# Patient Record
Sex: MALE | Race: OTHER | HISPANIC OR LATINO | URBAN - METROPOLITAN AREA
[De-identification: names, ages, dates, MRNs, and addresses within clinical notes are randomized per-mention and may not be internally consistent; named-entity substitution may affect disease eponyms.]

---

## 2017-01-17 PROBLEM — Z00.00 ENCOUNTER FOR PREVENTIVE HEALTH EXAMINATION: Status: ACTIVE | Noted: 2017-01-17

## 2017-05-17 ENCOUNTER — EMERGENCY (EMERGENCY)
Facility: HOSPITAL | Age: 67
LOS: 1 days | Discharge: HOME | End: 2017-05-17
Attending: EMERGENCY MEDICINE
Payer: SELF-PAY

## 2017-05-17 VITALS
RESPIRATION RATE: 20 BRPM | OXYGEN SATURATION: 95 % | WEIGHT: 139.99 LBS | TEMPERATURE: 101 F | DIASTOLIC BLOOD PRESSURE: 69 MMHG | HEART RATE: 92 BPM | HEIGHT: 64 IN | SYSTOLIC BLOOD PRESSURE: 113 MMHG

## 2017-05-17 DIAGNOSIS — I10 ESSENTIAL (PRIMARY) HYPERTENSION: ICD-10-CM

## 2017-05-17 DIAGNOSIS — J45.909 UNSPECIFIED ASTHMA, UNCOMPLICATED: ICD-10-CM

## 2017-05-17 DIAGNOSIS — J18.9 PNEUMONIA, UNSPECIFIED ORGANISM: ICD-10-CM

## 2017-05-17 LAB
ALBUMIN SERPL ELPH-MCNC: 3.5 G/DL — SIGNIFICANT CHANGE UP (ref 3.5–5)
ALP SERPL-CCNC: 87 U/L — SIGNIFICANT CHANGE UP (ref 40–120)
ALT FLD-CCNC: 29 U/L DA — SIGNIFICANT CHANGE UP (ref 10–60)
ANION GAP SERPL CALC-SCNC: 9 MMOL/L — SIGNIFICANT CHANGE UP (ref 5–17)
APTT BLD: 34.1 SEC — SIGNIFICANT CHANGE UP (ref 27.5–37.4)
AST SERPL-CCNC: 25 U/L — SIGNIFICANT CHANGE UP (ref 10–40)
BASOPHILS # BLD AUTO: 0.1 K/UL — SIGNIFICANT CHANGE UP (ref 0–0.2)
BASOPHILS NFR BLD AUTO: 0.5 % — SIGNIFICANT CHANGE UP (ref 0–2)
BILIRUB SERPL-MCNC: 0.9 MG/DL — SIGNIFICANT CHANGE UP (ref 0.2–1.2)
BUN SERPL-MCNC: 12 MG/DL — SIGNIFICANT CHANGE UP (ref 7–18)
CALCIUM SERPL-MCNC: 8.3 MG/DL — LOW (ref 8.4–10.5)
CHLORIDE SERPL-SCNC: 106 MMOL/L — SIGNIFICANT CHANGE UP (ref 96–108)
CO2 SERPL-SCNC: 26 MMOL/L — SIGNIFICANT CHANGE UP (ref 22–31)
CREAT SERPL-MCNC: 1 MG/DL — SIGNIFICANT CHANGE UP (ref 0.5–1.3)
EOSINOPHIL # BLD AUTO: 0 K/UL — SIGNIFICANT CHANGE UP (ref 0–0.5)
EOSINOPHIL NFR BLD AUTO: 0 % — SIGNIFICANT CHANGE UP (ref 0–6)
GLUCOSE SERPL-MCNC: 111 MG/DL — HIGH (ref 70–99)
HCT VFR BLD CALC: 40.1 % — SIGNIFICANT CHANGE UP (ref 39–50)
HGB BLD-MCNC: 13.2 G/DL — SIGNIFICANT CHANGE UP (ref 13–17)
INR BLD: 1.22 RATIO — HIGH (ref 0.88–1.16)
LYMPHOCYTES # BLD AUTO: 1.1 K/UL — SIGNIFICANT CHANGE UP (ref 1–3.3)
LYMPHOCYTES # BLD AUTO: 7.4 % — LOW (ref 13–44)
MCHC RBC-ENTMCNC: 30.4 PG — SIGNIFICANT CHANGE UP (ref 27–34)
MCHC RBC-ENTMCNC: 32.8 GM/DL — SIGNIFICANT CHANGE UP (ref 32–36)
MCV RBC AUTO: 92.7 FL — SIGNIFICANT CHANGE UP (ref 80–100)
MONOCYTES # BLD AUTO: 0.7 K/UL — SIGNIFICANT CHANGE UP (ref 0–0.9)
MONOCYTES NFR BLD AUTO: 4.6 % — SIGNIFICANT CHANGE UP (ref 2–14)
NEUTROPHILS # BLD AUTO: 13.2 K/UL — HIGH (ref 1.8–7.4)
NEUTROPHILS NFR BLD AUTO: 87.5 % — HIGH (ref 43–77)
PLATELET # BLD AUTO: 187 K/UL — SIGNIFICANT CHANGE UP (ref 150–400)
POTASSIUM SERPL-MCNC: 3.2 MMOL/L — LOW (ref 3.5–5.3)
POTASSIUM SERPL-SCNC: 3.2 MMOL/L — LOW (ref 3.5–5.3)
PROT SERPL-MCNC: 6.9 G/DL — SIGNIFICANT CHANGE UP (ref 6–8.3)
PROTHROM AB SERPL-ACNC: 13.4 SEC — HIGH (ref 9.8–12.7)
RBC # BLD: 4.33 M/UL — SIGNIFICANT CHANGE UP (ref 4.2–5.8)
RBC # FLD: 13 % — SIGNIFICANT CHANGE UP (ref 10.3–14.5)
SODIUM SERPL-SCNC: 141 MMOL/L — SIGNIFICANT CHANGE UP (ref 135–145)
TROPONIN I SERPL-MCNC: <0.015 NG/ML — SIGNIFICANT CHANGE UP (ref 0–0.04)
WBC # BLD: 15.1 K/UL — HIGH (ref 3.8–10.5)
WBC # FLD AUTO: 15.1 K/UL — HIGH (ref 3.8–10.5)

## 2017-05-17 PROCEDURE — 71020: CPT | Mod: 26

## 2017-05-17 PROCEDURE — 99285 EMERGENCY DEPT VISIT HI MDM: CPT | Mod: 25

## 2017-05-17 RX ORDER — AZITHROMYCIN 500 MG/1
500 TABLET, FILM COATED ORAL ONCE
Qty: 0 | Refills: 0 | Status: COMPLETED | OUTPATIENT
Start: 2017-05-17 | End: 2017-05-17

## 2017-05-17 RX ORDER — ALBUTEROL 90 UG/1
0 AEROSOL, METERED ORAL
Qty: 0 | Refills: 0 | COMMUNITY

## 2017-05-17 RX ORDER — SODIUM CHLORIDE 9 MG/ML
3 INJECTION INTRAMUSCULAR; INTRAVENOUS; SUBCUTANEOUS ONCE
Qty: 0 | Refills: 0 | Status: COMPLETED | OUTPATIENT
Start: 2017-05-17 | End: 2017-05-17

## 2017-05-17 RX ORDER — AZITHROMYCIN 500 MG/1
1 TABLET, FILM COATED ORAL
Qty: 4 | Refills: 0 | OUTPATIENT
Start: 2017-05-17 | End: 2017-05-21

## 2017-05-17 RX ORDER — MONTELUKAST 4 MG/1
0 TABLET, CHEWABLE ORAL
Qty: 0 | Refills: 0 | COMMUNITY

## 2017-05-17 RX ORDER — IPRATROPIUM/ALBUTEROL SULFATE 18-103MCG
3 AEROSOL WITH ADAPTER (GRAM) INHALATION ONCE
Qty: 0 | Refills: 0 | Status: COMPLETED | OUTPATIENT
Start: 2017-05-17 | End: 2017-05-17

## 2017-05-17 RX ORDER — ACETAMINOPHEN 500 MG
975 TABLET ORAL ONCE
Qty: 0 | Refills: 0 | Status: COMPLETED | OUTPATIENT
Start: 2017-05-17 | End: 2017-05-17

## 2017-05-17 RX ADMIN — Medication 3 MILLILITER(S): at 23:36

## 2017-05-17 RX ADMIN — SODIUM CHLORIDE 3 MILLILITER(S): 9 INJECTION INTRAMUSCULAR; INTRAVENOUS; SUBCUTANEOUS at 23:21

## 2017-05-17 RX ADMIN — Medication 975 MILLIGRAM(S): at 23:36

## 2017-05-17 NOTE — ED PROVIDER NOTE - OBJECTIVE STATEMENT
66m pmhx asthma, HTN p/w CP/SOB. started at 4pm while driving in car, located L chest, constant, worse with cough, a/w SOB and fever. fever for last 2 days, tmax 102F, wife is sick contact. states was admitted to OSH 2 months ago for CP, did not have stress test or angio. no FHx of premature CAD, never smoker.

## 2017-05-17 NOTE — ED PROVIDER NOTE - MEDICAL DECISION MAKING DETAILS
66m pmhx asthma, HTN p/w CP/SOB. started at 4pm while driving in car, located L chest, constant, worse with cough, a/w SOB and fever. fever for last 2 days, tmax 102F, wife is sick contact. states was admitted to OSH 2 months ago for CP, did not have stress test or angio. no FHx of premature CAD, never smoker. on PE, febrile, no distress, RRR, CTA b/l, abdo soft, non-TTP, no pedal edema. likely infectious, will CXR, send cardiac enzymes, give duoneb

## 2017-05-18 VITALS
HEART RATE: 89 BPM | TEMPERATURE: 99 F | DIASTOLIC BLOOD PRESSURE: 67 MMHG | OXYGEN SATURATION: 96 % | SYSTOLIC BLOOD PRESSURE: 111 MMHG | RESPIRATION RATE: 18 BRPM

## 2017-05-18 LAB
CK MB BLD-MCNC: <0.6 % — SIGNIFICANT CHANGE UP (ref 0–3.5)
CK MB CFR SERPL CALC: <1 NG/ML — SIGNIFICANT CHANGE UP (ref 0–3.6)
CK SERPL-CCNC: 159 U/L — SIGNIFICANT CHANGE UP (ref 35–232)
NT-PROBNP SERPL-SCNC: 140 PG/ML — HIGH (ref 0–125)

## 2017-05-18 PROCEDURE — 82553 CREATINE MB FRACTION: CPT

## 2017-05-18 PROCEDURE — 36000 PLACE NEEDLE IN VEIN: CPT

## 2017-05-18 PROCEDURE — 85027 COMPLETE CBC AUTOMATED: CPT

## 2017-05-18 PROCEDURE — 83880 ASSAY OF NATRIURETIC PEPTIDE: CPT

## 2017-05-18 PROCEDURE — 71046 X-RAY EXAM CHEST 2 VIEWS: CPT

## 2017-05-18 PROCEDURE — 80053 COMPREHEN METABOLIC PANEL: CPT

## 2017-05-18 PROCEDURE — 85730 THROMBOPLASTIN TIME PARTIAL: CPT

## 2017-05-18 PROCEDURE — 82550 ASSAY OF CK (CPK): CPT

## 2017-05-18 PROCEDURE — 85610 PROTHROMBIN TIME: CPT

## 2017-05-18 PROCEDURE — 84484 ASSAY OF TROPONIN QUANT: CPT

## 2017-05-18 PROCEDURE — 93005 ELECTROCARDIOGRAM TRACING: CPT

## 2017-05-18 PROCEDURE — 99283 EMERGENCY DEPT VISIT LOW MDM: CPT | Mod: 25

## 2017-05-18 PROCEDURE — 94640 AIRWAY INHALATION TREATMENT: CPT

## 2017-05-18 RX ADMIN — AZITHROMYCIN 500 MILLIGRAM(S): 500 TABLET, FILM COATED ORAL at 00:24

## 2018-03-24 ENCOUNTER — INPATIENT (INPATIENT)
Facility: HOSPITAL | Age: 68
LOS: 1 days | Discharge: HOME | DRG: 313 | End: 2018-03-26
Attending: INTERNAL MEDICINE | Admitting: INTERNAL MEDICINE
Payer: MEDICARE

## 2018-03-24 VITALS
HEART RATE: 74 BPM | OXYGEN SATURATION: 99 % | WEIGHT: 145.06 LBS | SYSTOLIC BLOOD PRESSURE: 129 MMHG | RESPIRATION RATE: 20 BRPM | TEMPERATURE: 98 F | HEIGHT: 64 IN | DIASTOLIC BLOOD PRESSURE: 86 MMHG

## 2018-03-24 DIAGNOSIS — Z29.9 ENCOUNTER FOR PROPHYLACTIC MEASURES, UNSPECIFIED: ICD-10-CM

## 2018-03-24 DIAGNOSIS — R07.9 CHEST PAIN, UNSPECIFIED: ICD-10-CM

## 2018-03-24 DIAGNOSIS — M54.2 CERVICALGIA: ICD-10-CM

## 2018-03-24 DIAGNOSIS — I24.9 ACUTE ISCHEMIC HEART DISEASE, UNSPECIFIED: ICD-10-CM

## 2018-03-24 LAB
ALBUMIN SERPL ELPH-MCNC: 3.4 G/DL — LOW (ref 3.5–5)
ALP SERPL-CCNC: 76 U/L — SIGNIFICANT CHANGE UP (ref 40–120)
ALT FLD-CCNC: 19 U/L DA — SIGNIFICANT CHANGE UP (ref 10–60)
ANION GAP SERPL CALC-SCNC: 6 MMOL/L — SIGNIFICANT CHANGE UP (ref 5–17)
APTT BLD: 34.3 SEC — SIGNIFICANT CHANGE UP (ref 27.5–37.4)
AST SERPL-CCNC: 18 U/L — SIGNIFICANT CHANGE UP (ref 10–40)
BASOPHILS # BLD AUTO: 0.1 K/UL — SIGNIFICANT CHANGE UP (ref 0–0.2)
BASOPHILS NFR BLD AUTO: 1.2 % — SIGNIFICANT CHANGE UP (ref 0–2)
BILIRUB SERPL-MCNC: 0.5 MG/DL — SIGNIFICANT CHANGE UP (ref 0.2–1.2)
BUN SERPL-MCNC: 12 MG/DL — SIGNIFICANT CHANGE UP (ref 7–18)
CALCIUM SERPL-MCNC: 8.3 MG/DL — LOW (ref 8.4–10.5)
CHLORIDE SERPL-SCNC: 107 MMOL/L — SIGNIFICANT CHANGE UP (ref 96–108)
CK MB BLD-MCNC: <1 % — SIGNIFICANT CHANGE UP (ref 0–3.5)
CK MB BLD-MCNC: <1.1 % — SIGNIFICANT CHANGE UP (ref 0–3.5)
CK MB CFR SERPL CALC: <1 NG/ML — SIGNIFICANT CHANGE UP (ref 0–3.6)
CK MB CFR SERPL CALC: <1 NG/ML — SIGNIFICANT CHANGE UP (ref 0–3.6)
CK SERPL-CCNC: 105 U/L — SIGNIFICANT CHANGE UP (ref 35–232)
CK SERPL-CCNC: 92 U/L — SIGNIFICANT CHANGE UP (ref 35–232)
CO2 SERPL-SCNC: 27 MMOL/L — SIGNIFICANT CHANGE UP (ref 22–31)
CREAT SERPL-MCNC: 0.86 MG/DL — SIGNIFICANT CHANGE UP (ref 0.5–1.3)
D DIMER BLD IA.RAPID-MCNC: <150 NG/ML DDU — SIGNIFICANT CHANGE UP
EOSINOPHIL # BLD AUTO: 0.1 K/UL — SIGNIFICANT CHANGE UP (ref 0–0.5)
EOSINOPHIL NFR BLD AUTO: 2.9 % — SIGNIFICANT CHANGE UP (ref 0–6)
GLUCOSE SERPL-MCNC: 78 MG/DL — SIGNIFICANT CHANGE UP (ref 70–99)
HCT VFR BLD CALC: 41.8 % — SIGNIFICANT CHANGE UP (ref 39–50)
HGB BLD-MCNC: 14.1 G/DL — SIGNIFICANT CHANGE UP (ref 13–17)
INR BLD: 1.1 RATIO — SIGNIFICANT CHANGE UP (ref 0.88–1.16)
LYMPHOCYTES # BLD AUTO: 0.9 K/UL — LOW (ref 1–3.3)
LYMPHOCYTES # BLD AUTO: 19.4 % — SIGNIFICANT CHANGE UP (ref 13–44)
MCHC RBC-ENTMCNC: 31.5 PG — SIGNIFICANT CHANGE UP (ref 27–34)
MCHC RBC-ENTMCNC: 33.8 GM/DL — SIGNIFICANT CHANGE UP (ref 32–36)
MCV RBC AUTO: 93.2 FL — SIGNIFICANT CHANGE UP (ref 80–100)
MONOCYTES # BLD AUTO: 0.4 K/UL — SIGNIFICANT CHANGE UP (ref 0–0.9)
MONOCYTES NFR BLD AUTO: 7.8 % — SIGNIFICANT CHANGE UP (ref 2–14)
NEUTROPHILS # BLD AUTO: 3.2 K/UL — SIGNIFICANT CHANGE UP (ref 1.8–7.4)
NEUTROPHILS NFR BLD AUTO: 68.7 % — SIGNIFICANT CHANGE UP (ref 43–77)
PLATELET # BLD AUTO: 172 K/UL — SIGNIFICANT CHANGE UP (ref 150–400)
POTASSIUM SERPL-MCNC: 3.7 MMOL/L — SIGNIFICANT CHANGE UP (ref 3.5–5.3)
POTASSIUM SERPL-SCNC: 3.7 MMOL/L — SIGNIFICANT CHANGE UP (ref 3.5–5.3)
PROT SERPL-MCNC: 6.5 G/DL — SIGNIFICANT CHANGE UP (ref 6–8.3)
PROTHROM AB SERPL-ACNC: 12 SEC — SIGNIFICANT CHANGE UP (ref 9.8–12.7)
RBC # BLD: 4.48 M/UL — SIGNIFICANT CHANGE UP (ref 4.2–5.8)
RBC # FLD: 12.6 % — SIGNIFICANT CHANGE UP (ref 10.3–14.5)
SODIUM SERPL-SCNC: 140 MMOL/L — SIGNIFICANT CHANGE UP (ref 135–145)
TROPONIN I SERPL-MCNC: <0.015 NG/ML — SIGNIFICANT CHANGE UP (ref 0–0.04)
TROPONIN I SERPL-MCNC: <0.015 NG/ML — SIGNIFICANT CHANGE UP (ref 0–0.04)
WBC # BLD: 4.6 K/UL — SIGNIFICANT CHANGE UP (ref 3.8–10.5)
WBC # FLD AUTO: 4.6 K/UL — SIGNIFICANT CHANGE UP (ref 3.8–10.5)

## 2018-03-24 PROCEDURE — 71046 X-RAY EXAM CHEST 2 VIEWS: CPT | Mod: 26

## 2018-03-24 PROCEDURE — 70450 CT HEAD/BRAIN W/O DYE: CPT | Mod: 26

## 2018-03-24 PROCEDURE — 99285 EMERGENCY DEPT VISIT HI MDM: CPT

## 2018-03-24 PROCEDURE — 72125 CT NECK SPINE W/O DYE: CPT | Mod: 26

## 2018-03-24 RX ORDER — ENOXAPARIN SODIUM 100 MG/ML
40 INJECTION SUBCUTANEOUS DAILY
Qty: 0 | Refills: 0 | Status: DISCONTINUED | OUTPATIENT
Start: 2018-03-24 | End: 2018-03-26

## 2018-03-24 RX ORDER — ASPIRIN/CALCIUM CARB/MAGNESIUM 324 MG
81 TABLET ORAL DAILY
Qty: 0 | Refills: 0 | Status: DISCONTINUED | OUTPATIENT
Start: 2018-03-24 | End: 2018-03-26

## 2018-03-24 RX ORDER — METOPROLOL TARTRATE 50 MG
12.5 TABLET ORAL
Qty: 0 | Refills: 0 | Status: DISCONTINUED | OUTPATIENT
Start: 2018-03-24 | End: 2018-03-26

## 2018-03-24 RX ORDER — SIMVASTATIN 20 MG/1
20 TABLET, FILM COATED ORAL AT BEDTIME
Qty: 0 | Refills: 0 | Status: DISCONTINUED | OUTPATIENT
Start: 2018-03-24 | End: 2018-03-26

## 2018-03-24 RX ORDER — ACETAMINOPHEN 500 MG
650 TABLET ORAL ONCE
Qty: 0 | Refills: 0 | Status: DISCONTINUED | OUTPATIENT
Start: 2018-03-24 | End: 2018-03-26

## 2018-03-24 RX ADMIN — Medication 12.5 MILLIGRAM(S): at 22:58

## 2018-03-24 RX ADMIN — SIMVASTATIN 20 MILLIGRAM(S): 20 TABLET, FILM COATED ORAL at 22:59

## 2018-03-24 RX ADMIN — Medication 81 MILLIGRAM(S): at 22:58

## 2018-03-24 NOTE — H&P ADULT - PROBLEM SELECTOR PLAN 3
Improve VTE score: 2 (Lovenox for VTE ppx)  [] Previous VTE                                                3  [] Thrombophilia                                             2  [] Lower limb paralysis                                   2    [] Current Cancer                                             2   [x] Immobilization > 24 hrs                              1  [] ICU/CCU stay > 24 hours                             1  [x] Age > 60                                                         1

## 2018-03-24 NOTE — ED ADULT NURSE REASSESSMENT NOTE - NS ED NURSE REASSESS COMMENT FT1
Pt received axox3 resting in bed on cardiac monitor with family member at bedside no respiratory or cardiac distress noted, ed observation continues.

## 2018-03-24 NOTE — H&P ADULT - PROBLEM SELECTOR PLAN 1
Moderate chest pain radiating to Left arm  Troponin x 1 Neg  EKG: NSR 71 bpm with T wave inversion in II, III and V4 to V6  CXR: Clear   HEART score: 3 (Low probability)  Started ASA, Statin and B-Blocker  F/u Echo Moderate chest pain radiating to Left arm  Troponin x 1 Neg  EKG: NSR 71 bpm with T wave inversion in II, III and V4 to V6  CXR: Clear   HEART score: 3 (Low probability)  Started ASA, Statin and B-Blocker  Trend cardiac enzymes  F/u Echo Moderate chest pain radiating to Left arm  Troponin x 1 Neg  EKG: NSR 71 bpm with T wave inversion in II, III and V4 to V6  CXR: Clear   HEART score: 3 (Low probability)  Started ASA, Statin and B-Blocker  Trend cardiac enzymes  F/u Echo  Consulted Dr Gómez

## 2018-03-24 NOTE — H&P ADULT - NSHPREVIEWOFSYSTEMS_GEN_ALL_CORE
CONSTITUTIONAL: No weakness, fevers or chills  EYES/ENT: No visual changes;  No vertigo or throat pain   NECK: Mild pain at back  RESPIRATORY: No cough, wheezing, hemoptysis; No shortness of breath  CARDIOVASCULAR: Mild chest pain  GASTROINTESTINAL: No abdominal or epigastric pain. No nausea, vomiting, or hematemesis; No diarrhea or constipation. No melena or hematochezia.  GENITOURINARY: No dysuria, frequency or hematuria  NEUROLOGICAL: No numbness or weakness  SKIN: No itching, burning, rashes, or lesions   All other review of systems is negative unless indicated above.

## 2018-03-24 NOTE — H&P ADULT - NSHPPHYSICALEXAM_GEN_ALL_CORE
GENERAL: Well developed, Well nourished male, NAD  HEENT:  Normocephalic/Atraumatic, reactive light reflex, moist mucous membranes, nonpalpable lymph nodes, no thyromegaly  NECK: Supple, no JVD  RESP: Symmetric movement of the chest, clear to auscultation bilaterally, no wheeze, no rhonchi, no crackles  CVS: S1 and S2 audible, no murmur, rubs or gallops noted  GI: Normal active bowel sounds present, abdomen soft, non tender, non distended, without scar marks, no hepatosplenomegaly  EXTREMITIES: no edema, no clubbing, cyanosis  MSK: 4/5 strength bilateral upper and lower extremities, intact sensations to light & crude touch, mild dizziness  PSYCH: Normal mood, normal affect observed  NEURO: alert and oriented x 3

## 2018-03-24 NOTE — H&P ADULT - ATTENDING COMMENTS
68 yo male from home lives with wife, no PMH uses Aspirin at home came to ED with Left sided chest pain for last 7 days. His pain got worse and started radiating to the Left arm 3 days ago. Pain is moderate in intensity and wasn't improving with ibuprofen at home. Pain gets aggravated with laying down at night. Pt has some associated Neck pain and movement of neck also exacerbates pain. He denies SOB, fever, cough, recent travel, sick contacts, problems with urine or bowel. Pt was admitted in [facility] >1 year ago with fall and dizziness and all the work up done was normal.     FH: Gastric caner in Father    ED Course: Hemodynamically stable without tachycardia. Saturating well in Room Air. Clear CXR and normal cardiac enzymes. EKG showed NSR 71 bpm with T wave inversion in II, III and V4 to V6.     pt seen in bed, a+o x3, nad, vitals stable, physical exam reveals no focal motor deficit, clear lungs, regular s1s2, abd soft nd nt bs+, ext no edema. labs and diagnostic test result reviewed.    assessment   --- chest pain with ekg changes r/o acs, sinusitis    plan  --  adm to tele, acs protocol, lopressor, aspirin, statin, fluticasone nasal spray, cont preadmit home meds, gi and dvt profilaxis  cbc, bmp, mg, phos, lipid, tsh, ce q8 x3    echo    cardio cons

## 2018-03-24 NOTE — H&P ADULT - HISTORY OF PRESENT ILLNESS
68 yo male from home lives with wife, no PMH uses Aspirin at home came to ED with Left sided chest pain for last 7 days. His pain got worse and started radiating to the Left arm 3 days ago. Pain is moderate in intensity and wasn't improving with ibuprofen at home. Pain gets aggravated with laying down at night. Pt has some associated Neck pain and movement of neck also exacerbates pain. He denies SOB, fever, cough, recent travel, sick contacts, problems with urine or bowel. Pt was admitted in [facility] >1 year ago with fall and dizziness and all the work up done was normal.     FH: Gastric caner in Father    ED Course: Hemodynamically stable without tachycardia. Saturating well in Room Air. Clear CXR and normal cardiac enzymes. EKG showed NSR 71 bpm with T wave inversion in II, III and V4 to V6. 66 yo male from home lives with wife, no PMH uses Aspirin at home came to ED with Left sided chest pain for last 7 days. His pain got worse and started radiating to the Left arm 3 days ago. Pain is moderate in intensity and wasn't improving with ibuprofen at home. Pain gets aggravated with laying down at night. Pt has some associated Neck pain and movement of neck also exacerbates pain. He denies SOB, fever, cough, recent travel, sick contacts, problems with urine or bowel. Pt was admitted in [facility] >1 year ago with fall and dizziness and all the work up done was normal.     FH: Gastric caner in Father    ED Course: Hemodynamically stable without tachycardia. Saturating well in Room Air. Clear CXR and normal cardiac enzymes. EKG showed NSR 71 bpm with T wave inversion in II, III and V4 to V6.     Wife [phone]  Son [phone]  Son-In-law Mina [phone]

## 2018-03-24 NOTE — H&P ADULT - ASSESSMENT
66 yo male from home lives with wife, no PMH uses Aspirin at home came to ED with Left sided chest pain for last 7 days. His pain got worse and started radiating to the Left arm 3 days ago. Pain is moderate in intensity and wasn't improving with ibuprofen at home. Pain gets aggravated with laying down at night. Pt has some associated Neck pain and movement of neck also exacerbates pain.     ED Course: Hemodynamically stable without tachycardia. Saturating well in Room Air. Clear CXR and normal cardiac enzymes. EKG showed NSR 71 bpm with T wave inversion in II, III and V4 to V6.

## 2018-03-24 NOTE — ED PROVIDER NOTE - OBJECTIVE STATEMENT
68 y/o male with PMHx of HTN, asthma presents to the ED c/o L sided CP x 3 days. Pt notes the pain is constant but worse at night. Pain also radiates down his L arm. Pt denies SOB, fever, cough, or any other complaints. Pt currently taking ASA. NKDA

## 2018-03-24 NOTE — H&P ADULT - PROBLEM SELECTOR PLAN 2
Chronic pain with pain radiating to Left arm  Exacerbates at night upon laying down   History of a fall and dizziness > 1 year ago  F/u CT head and CT Neck to r/o Radiculopathy

## 2018-03-25 LAB
ALBUMIN SERPL ELPH-MCNC: 3.3 G/DL — LOW (ref 3.5–5)
ALP SERPL-CCNC: 73 U/L — SIGNIFICANT CHANGE UP (ref 40–120)
ALT FLD-CCNC: 19 U/L DA — SIGNIFICANT CHANGE UP (ref 10–60)
ANION GAP SERPL CALC-SCNC: 9 MMOL/L — SIGNIFICANT CHANGE UP (ref 5–17)
AST SERPL-CCNC: 19 U/L — SIGNIFICANT CHANGE UP (ref 10–40)
BILIRUB SERPL-MCNC: 0.6 MG/DL — SIGNIFICANT CHANGE UP (ref 0.2–1.2)
BUN SERPL-MCNC: 13 MG/DL — SIGNIFICANT CHANGE UP (ref 7–18)
CALCIUM SERPL-MCNC: 8.6 MG/DL — SIGNIFICANT CHANGE UP (ref 8.4–10.5)
CHLORIDE SERPL-SCNC: 105 MMOL/L — SIGNIFICANT CHANGE UP (ref 96–108)
CHOLEST SERPL-MCNC: 191 MG/DL — SIGNIFICANT CHANGE UP (ref 10–199)
CK MB BLD-MCNC: <1.3 % — SIGNIFICANT CHANGE UP (ref 0–3.5)
CK MB CFR SERPL CALC: <1 NG/ML — SIGNIFICANT CHANGE UP (ref 0–3.6)
CK SERPL-CCNC: 78 U/L — SIGNIFICANT CHANGE UP (ref 35–232)
CO2 SERPL-SCNC: 27 MMOL/L — SIGNIFICANT CHANGE UP (ref 22–31)
CREAT SERPL-MCNC: 0.85 MG/DL — SIGNIFICANT CHANGE UP (ref 0.5–1.3)
GLUCOSE SERPL-MCNC: 85 MG/DL — SIGNIFICANT CHANGE UP (ref 70–99)
HBA1C BLD-MCNC: 5.7 % — HIGH (ref 4–5.6)
HCT VFR BLD CALC: 44.9 % — SIGNIFICANT CHANGE UP (ref 39–50)
HDLC SERPL-MCNC: 47 MG/DL — SIGNIFICANT CHANGE UP (ref 40–125)
HGB BLD-MCNC: 14.6 G/DL — SIGNIFICANT CHANGE UP (ref 13–17)
LIPID PNL WITH DIRECT LDL SERPL: 120 MG/DL — SIGNIFICANT CHANGE UP
MAGNESIUM SERPL-MCNC: 2.3 MG/DL — SIGNIFICANT CHANGE UP (ref 1.6–2.6)
MCHC RBC-ENTMCNC: 30.2 PG — SIGNIFICANT CHANGE UP (ref 27–34)
MCHC RBC-ENTMCNC: 32.5 GM/DL — SIGNIFICANT CHANGE UP (ref 32–36)
MCV RBC AUTO: 93 FL — SIGNIFICANT CHANGE UP (ref 80–100)
PHOSPHATE SERPL-MCNC: 3.1 MG/DL — SIGNIFICANT CHANGE UP (ref 2.5–4.5)
PLATELET # BLD AUTO: 173 K/UL — SIGNIFICANT CHANGE UP (ref 150–400)
POTASSIUM SERPL-MCNC: 3.7 MMOL/L — SIGNIFICANT CHANGE UP (ref 3.5–5.3)
POTASSIUM SERPL-SCNC: 3.7 MMOL/L — SIGNIFICANT CHANGE UP (ref 3.5–5.3)
PROT SERPL-MCNC: 6.5 G/DL — SIGNIFICANT CHANGE UP (ref 6–8.3)
RBC # BLD: 4.83 M/UL — SIGNIFICANT CHANGE UP (ref 4.2–5.8)
RBC # FLD: 12.4 % — SIGNIFICANT CHANGE UP (ref 10.3–14.5)
SODIUM SERPL-SCNC: 141 MMOL/L — SIGNIFICANT CHANGE UP (ref 135–145)
TOTAL CHOLESTEROL/HDL RATIO MEASUREMENT: 4.1 RATIO — SIGNIFICANT CHANGE UP (ref 3.4–9.6)
TRIGL SERPL-MCNC: 121 MG/DL — SIGNIFICANT CHANGE UP (ref 10–149)
TROPONIN I SERPL-MCNC: <0.015 NG/ML — SIGNIFICANT CHANGE UP (ref 0–0.04)
TSH SERPL-MCNC: 2.2 UU/ML — SIGNIFICANT CHANGE UP (ref 0.34–4.82)
VIT B12 SERPL-MCNC: 345 PG/ML — SIGNIFICANT CHANGE UP (ref 232–1245)
WBC # BLD: 4.9 K/UL — SIGNIFICANT CHANGE UP (ref 3.8–10.5)
WBC # FLD AUTO: 4.9 K/UL — SIGNIFICANT CHANGE UP (ref 3.8–10.5)

## 2018-03-25 RX ADMIN — SIMVASTATIN 20 MILLIGRAM(S): 20 TABLET, FILM COATED ORAL at 23:10

## 2018-03-25 RX ADMIN — Medication 12.5 MILLIGRAM(S): at 05:39

## 2018-03-25 RX ADMIN — ENOXAPARIN SODIUM 40 MILLIGRAM(S): 100 INJECTION SUBCUTANEOUS at 11:30

## 2018-03-25 RX ADMIN — Medication 12.5 MILLIGRAM(S): at 18:01

## 2018-03-25 RX ADMIN — Medication 81 MILLIGRAM(S): at 11:30

## 2018-03-25 NOTE — CONSULT NOTE ADULT - ASSESSMENT
7 yr old male from home lives with wife, no PMH uses Aspirin at home came to ED with Left sided chest pain for last 7 days. His pain  radiating from Left arm to his chest and back while at rest, c6-7 spinal stenosis.  1.D/C tele.  2.Echocardiogram.  3.Stress test-r/o ischemia.  4.Ortho spine eval.  5.Continue current medication.  6.Gi and DVT prophylaxis.

## 2018-03-25 NOTE — CONSULT NOTE ADULT - SUBJECTIVE AND OBJECTIVE BOX
CHIEF COMPLAINT:Patient is a 67y old  Male who presents with a chief complaint of Chest pain radiating to Left Forearm (24 Mar 2018 18:33)      HPI:  67 yr old male from home lives with wife, no PMH uses Aspirin at home came to ED with Left sided chest pain for last 7 days. His pain  radiating from Left arm to his chest and back while at rest. Pain is moderate in intensity and wasn't improving with ibuprofen at home. Pain gets aggravated with laying down at night. Pt has some associated Neck pain and movement of neck also exacerbates pain. He denies SOB, fever, cough, recent travel, sick contacts, problems with urine or bowel. Pt was admitted in [facility] >1 year ago with fall and dizziness and all the work up done was normal.     PAST MEDICAL & SURGICAL HISTORY:  Asthma  HTN (Hypertension)        MEDICATIONS  (STANDING):  aspirin enteric coated 81 milliGRAM(s) Oral daily  enoxaparin Injectable 40 milliGRAM(s) SubCutaneous daily  metoprolol tartrate 12.5 milliGRAM(s) Oral two times a day  simvastatin 20 milliGRAM(s) Oral at bedtime    MEDICATIONS  (PRN):  acetaminophen   Tablet. 650 milliGRAM(s) Oral once PRN Moderate Pain (4 - 6)      FAMILY HISTORY:  Family history of gastric cancer (Father)      SOCIAL HISTORY:    [x ] Non-smoker    [x ] Alcohol-dnies    Allergies    No Known Allergies    Intolerances    	    REVIEW OF SYSTEMS:  CONSTITUTIONAL: No fever, weight loss, or fatigue  EYES: No eye pain, visual disturbances, or discharge  ENT:  No difficulty hearing, tinnitus, vertigo; No sinus or throat pain  NECK: No pain or stiffness  RESPIRATORY: No cough, wheezing, chills or hemoptysis; No Shortness of Breath  CARDIOVASCULAR: + chest pain, No palpitations, passing out, dizziness, or leg swelling  GASTROINTESTINAL: No abdominal or epigastric pain. No nausea, vomiting, or hematemesis; No diarrhea or constipation. No melena or hematochezia.  GENITOURINARY: No dysuria, frequency, hematuria, or incontinence  NEUROLOGICAL: No headaches, memory loss, loss of strength, numbness, or tremors  SKIN: No itching, burning, rashes, or lesions   LYMPH Nodes: No enlarged glands  ENDOCRINE: No heat or cold intolerance; No hair loss  MUSCULOSKELETAL: No joint pain or swelling; No muscle, + back pain, +neck pain  PSYCHIATRIC: No depression, anxiety, mood swings, or difficulty sleeping  HEME/LYMPH: No easy bruising, or bleeding gums  ALLERGY AND IMMUNOLOGIC: No hives or eczema	      PHYSICAL EXAM:  T(C): 36.6 (03-25-18 @ 07:47), Max: 37.1 (03-24-18 @ 19:49)  HR: 59 (03-25-18 @ 07:47) (59 - 72)  BP: 118/81 (03-25-18 @ 07:47) (118/81 - 139/93)  RR: 18 (03-25-18 @ 07:47) (18 - 18)  SpO2: 100% (03-25-18 @ 07:47) (97% - 100%)  Wt(kg): --  I&O's Summary      Appearance: Normal	  HEENT:   Normal oral mucosa, PERRL, EOMI	  Lymphatic: No lymphadenopathy  Cardiovascular: Normal S1 S2, No JVD, No murmurs, No edema  Respiratory: Lungs clear to auscultation	  Psychiatry: A & O x 3, Mood & affect appropriate  Gastrointestinal:  Soft, Non-tender, + BS	  Skin: No rashes, No ecchymoses, No cyanosis	  Neurologic: Non-focal  Extremities: Normal range of motion, No clubbing, cyanosis or edema  Vascular: Peripheral pulses palpable 2+ bilaterally        ECG:  	nsr with t wave inversion inferior and lateral leads    	  LABS:	 	    CARDIAC MARKERS:  CARDIAC MARKERS ( 25 Mar 2018 07:39 )  <0.015 ng/mL / x     / 78 U/L / x     / <1.0 ng/mL  CARDIAC MARKERS ( 24 Mar 2018 19:34 )  <0.015 ng/mL / x     / 92 U/L / x     / <1.0 ng/mL  CARDIAC MARKERS ( 24 Mar 2018 12:39 )  <0.015 ng/mL / x     / 105 U/L / x     / <1.0 ng/mL                              14.6   4.9   )-----------( 173      ( 25 Mar 2018 07:39 )             44.9     03-25    141  |  105  |  13  ----------------------------<  85  3.7   |  27  |  0.85    Ca    8.6      25 Mar 2018 07:39  Phos  3.1     03-25  Mg     2.3     03-25    TPro  6.5  /  Alb  3.3<L>  /  TBili  0.6  /  DBili  x   /  AST  19  /  ALT  19  /  AlkPhos  73  03-25      Lipid Profile: Cholesterol 191    HDL 47      HgA1c: Hemoglobin A1C, Whole Blood: 5.7 % (03-25 @ 10:21)    TSH: Thyroid Stimulating Hormone, Serum: 2.20 uU/mL (03-25 @ 07:39)      EXAM:  CT BRAIN                            PROCEDURE DATE:  03/24/2018          INTERPRETATION:  CT HEAD WITHOUT CONTRAST    CLINICAL STATEMENT: 67-year-old male with dizziness.    COMPARISON: None available.    TECHNIQUE: Noncontrast axial CT headwas obtained from the skull base to   vertex.    FINDINGS:  There is no evidence of acute intracranial hemorrhage, mass effect or   midline shift. No CT evidence of acute large territory vascular infarct.   The ventricles and cortical sulci are within normal limits for age.   Scattered hypodensities in the periventricular white matter are   nonspecific, but likely sequela of small vessel ischemic disease.   Intracranial atherosclerotic calcifications are present.    Complete opacification of the right sphenoid sinus. Visualized mastoid   air cells are well aerated.    IMPRESSION:  No acute intracranial hemorrhage, mass effect or midline shift. Further   evaluation with MRI may be performed as clinically indicated.    Right sphenoid sinus complete opacification; correlate for sinusitis.    A preliminary report was issued by Dr. Butcher.    HISTORY: Left upper extremity numbness, radiculopathy.    COMPARISON: None available.    TECHNIQUE: CT scan of the cervical spine was performed without the   administration of intravenous iodinated contrast. Multiplanar   reformations were made.    FINDINGS:  There is no prevertebral soft tissue swelling.  Straightening of the normal cervical lordosis. Grade 1 anterolisthesis of   C7 on T1.  Vertebral body heights are maintained. No evidence of cervical spine   fracture.    Mild facet arthrosis throughout the cervical spine.  At C5-C6, mild uncovertebral and facet arthrosis contributes to mild bony   encroachment on the bilateral neural foramina.  At C6-C7, mild facet arthrosis and uncovertebral arthrosis, worse on the   left, results in moderate-severe bony encroachment on the left and mild   on the right neural foramina.    There is ossification of the ligamentum nuchae at C4-C5 through C7 levels.    Mild scarring of the visualized lung apices.    IMPRESSION:  Mild facet arthrosis throughout the cervical spine.     At C5-C6 and C6-C7, there is also mild uncovertebral arthrosis:  -at C5-C6,there is mild bony narrowing of the bilateral neural foramina.   -at C6-C7, bony encroachment causes moderate-severe narrowing of the left   neural foramen, and mild narrowing of the right neural foramen.

## 2018-03-26 ENCOUNTER — TRANSCRIPTION ENCOUNTER (OUTPATIENT)
Age: 68
End: 2018-03-26

## 2018-03-26 VITALS
RESPIRATION RATE: 16 BRPM | SYSTOLIC BLOOD PRESSURE: 116 MMHG | DIASTOLIC BLOOD PRESSURE: 73 MMHG | HEART RATE: 75 BPM | OXYGEN SATURATION: 98 % | TEMPERATURE: 98 F

## 2018-03-26 LAB
24R-OH-CALCIDIOL SERPL-MCNC: 15.3 NG/ML — LOW (ref 30–80)
ALBUMIN SERPL ELPH-MCNC: 3.4 G/DL — LOW (ref 3.5–5)
ALP SERPL-CCNC: 78 U/L — SIGNIFICANT CHANGE UP (ref 40–120)
ALT FLD-CCNC: 18 U/L DA — SIGNIFICANT CHANGE UP (ref 10–60)
ANION GAP SERPL CALC-SCNC: 6 MMOL/L — SIGNIFICANT CHANGE UP (ref 5–17)
AST SERPL-CCNC: 15 U/L — SIGNIFICANT CHANGE UP (ref 10–40)
BILIRUB SERPL-MCNC: 0.4 MG/DL — SIGNIFICANT CHANGE UP (ref 0.2–1.2)
BUN SERPL-MCNC: 15 MG/DL — SIGNIFICANT CHANGE UP (ref 7–18)
CALCIUM SERPL-MCNC: 8.4 MG/DL — SIGNIFICANT CHANGE UP (ref 8.4–10.5)
CHLORIDE SERPL-SCNC: 106 MMOL/L — SIGNIFICANT CHANGE UP (ref 96–108)
CO2 SERPL-SCNC: 30 MMOL/L — SIGNIFICANT CHANGE UP (ref 22–31)
CREAT SERPL-MCNC: 0.92 MG/DL — SIGNIFICANT CHANGE UP (ref 0.5–1.3)
GLUCOSE SERPL-MCNC: 95 MG/DL — SIGNIFICANT CHANGE UP (ref 70–99)
HCT VFR BLD CALC: 45.9 % — SIGNIFICANT CHANGE UP (ref 39–50)
HGB BLD-MCNC: 14.8 G/DL — SIGNIFICANT CHANGE UP (ref 13–17)
MAGNESIUM SERPL-MCNC: 2.3 MG/DL — SIGNIFICANT CHANGE UP (ref 1.6–2.6)
MCHC RBC-ENTMCNC: 30.1 PG — SIGNIFICANT CHANGE UP (ref 27–34)
MCHC RBC-ENTMCNC: 32.2 GM/DL — SIGNIFICANT CHANGE UP (ref 32–36)
MCV RBC AUTO: 93.6 FL — SIGNIFICANT CHANGE UP (ref 80–100)
PHOSPHATE SERPL-MCNC: 3.3 MG/DL — SIGNIFICANT CHANGE UP (ref 2.5–4.5)
PLATELET # BLD AUTO: 175 K/UL — SIGNIFICANT CHANGE UP (ref 150–400)
POTASSIUM SERPL-MCNC: 3.6 MMOL/L — SIGNIFICANT CHANGE UP (ref 3.5–5.3)
POTASSIUM SERPL-SCNC: 3.6 MMOL/L — SIGNIFICANT CHANGE UP (ref 3.5–5.3)
PROT SERPL-MCNC: 6.6 G/DL — SIGNIFICANT CHANGE UP (ref 6–8.3)
RBC # BLD: 4.9 M/UL — SIGNIFICANT CHANGE UP (ref 4.2–5.8)
RBC # FLD: 12.7 % — SIGNIFICANT CHANGE UP (ref 10.3–14.5)
SODIUM SERPL-SCNC: 142 MMOL/L — SIGNIFICANT CHANGE UP (ref 135–145)
WBC # BLD: 5.2 K/UL — SIGNIFICANT CHANGE UP (ref 3.8–10.5)
WBC # FLD AUTO: 5.2 K/UL — SIGNIFICANT CHANGE UP (ref 3.8–10.5)

## 2018-03-26 PROCEDURE — 85610 PROTHROMBIN TIME: CPT

## 2018-03-26 PROCEDURE — 80061 LIPID PANEL: CPT

## 2018-03-26 PROCEDURE — 78452 HT MUSCLE IMAGE SPECT MULT: CPT

## 2018-03-26 PROCEDURE — 85730 THROMBOPLASTIN TIME PARTIAL: CPT

## 2018-03-26 PROCEDURE — 93017 CV STRESS TEST TRACING ONLY: CPT

## 2018-03-26 PROCEDURE — 71046 X-RAY EXAM CHEST 2 VIEWS: CPT

## 2018-03-26 PROCEDURE — 82306 VITAMIN D 25 HYDROXY: CPT

## 2018-03-26 PROCEDURE — 93005 ELECTROCARDIOGRAM TRACING: CPT

## 2018-03-26 PROCEDURE — 85027 COMPLETE CBC AUTOMATED: CPT

## 2018-03-26 PROCEDURE — 82607 VITAMIN B-12: CPT

## 2018-03-26 PROCEDURE — 82553 CREATINE MB FRACTION: CPT

## 2018-03-26 PROCEDURE — 72125 CT NECK SPINE W/O DYE: CPT

## 2018-03-26 PROCEDURE — 84100 ASSAY OF PHOSPHORUS: CPT

## 2018-03-26 PROCEDURE — 82550 ASSAY OF CK (CPK): CPT

## 2018-03-26 PROCEDURE — 83735 ASSAY OF MAGNESIUM: CPT

## 2018-03-26 PROCEDURE — A9502: CPT

## 2018-03-26 PROCEDURE — 93306 TTE W/DOPPLER COMPLETE: CPT

## 2018-03-26 PROCEDURE — 99285 EMERGENCY DEPT VISIT HI MDM: CPT | Mod: 25

## 2018-03-26 PROCEDURE — 70450 CT HEAD/BRAIN W/O DYE: CPT

## 2018-03-26 PROCEDURE — 84484 ASSAY OF TROPONIN QUANT: CPT

## 2018-03-26 PROCEDURE — 84443 ASSAY THYROID STIM HORMONE: CPT

## 2018-03-26 PROCEDURE — 80053 COMPREHEN METABOLIC PANEL: CPT

## 2018-03-26 PROCEDURE — 85379 FIBRIN DEGRADATION QUANT: CPT

## 2018-03-26 PROCEDURE — 83036 HEMOGLOBIN GLYCOSYLATED A1C: CPT

## 2018-03-26 RX ORDER — TRAMADOL HYDROCHLORIDE 50 MG/1
1 TABLET ORAL
Qty: 20 | Refills: 0 | OUTPATIENT
Start: 2018-03-26 | End: 2018-03-30

## 2018-03-26 RX ORDER — METOPROLOL TARTRATE 50 MG
1 TABLET ORAL
Qty: 30 | Refills: 0 | OUTPATIENT
Start: 2018-03-26 | End: 2018-04-24

## 2018-03-26 RX ORDER — ASPIRIN/CALCIUM CARB/MAGNESIUM 324 MG
1 TABLET ORAL
Qty: 30 | Refills: 0 | OUTPATIENT
Start: 2018-03-26 | End: 2018-04-24

## 2018-03-26 RX ORDER — ERGOCALCIFEROL 1.25 MG/1
50000 CAPSULE ORAL
Qty: 0 | Refills: 0 | Status: DISCONTINUED | OUTPATIENT
Start: 2018-03-26 | End: 2018-03-26

## 2018-03-26 RX ADMIN — ENOXAPARIN SODIUM 40 MILLIGRAM(S): 100 INJECTION SUBCUTANEOUS at 13:09

## 2018-03-26 RX ADMIN — ERGOCALCIFEROL 50000 UNIT(S): 1.25 CAPSULE ORAL at 13:09

## 2018-03-26 RX ADMIN — Medication 81 MILLIGRAM(S): at 13:09

## 2018-03-26 RX ADMIN — Medication 12.5 MILLIGRAM(S): at 05:32

## 2018-03-26 NOTE — PROGRESS NOTE ADULT - ASSESSMENT
7 yr old male from home lives with wife, no PMH uses Aspirin at home came to ED with Left sided chest pain for last 7 days. His pain  radiating from Left arm to his chest and back while at rest, c6-7 spinal stenosis.  1.Echocardiogram.  2.Stress test-r/o ischemia.  3.Ortho spine eval.  4.Continue current medication.  5.Gi and DVT prophylaxis.

## 2018-03-26 NOTE — DISCHARGE NOTE ADULT - PLAN OF CARE
To be pain free Please follow up with Dr. Johnson within 2 weeks   Diet as tolerated Negative Stress test Follow up with Dr. Gómez as needed

## 2018-03-26 NOTE — DISCHARGE NOTE ADULT - PATIENT PORTAL LINK FT
You can access the [facility] Patient Portal, offered by [facility], by registering with the following website: [email or web address]

## 2018-03-26 NOTE — DISCHARGE NOTE ADULT - MEDICATION SUMMARY - MEDICATIONS TO STOP TAKING
I will STOP taking the medications listed below when I get home from the hospital:    azithromycin 250 mg oral tablet  -- 1 tab(s) by mouth once a day  -- Do not take dairy products, antacids, or iron preparations within one hour of this medication.  Finish all this medication unless otherwise directed by prescriber.

## 2018-03-26 NOTE — PROGRESS NOTE ADULT - SUBJECTIVE AND OBJECTIVE BOX
CHIEF COMPLAINT:Patient is a 67y old  Male who presents with a chief complaint of Chest pain radiating to Left Forearm .Pt appears comfortable.    	  REVIEW OF SYSTEMS:  CONSTITUTIONAL: No fever, weight loss, or fatigue  EYES: No eye pain, visual disturbances, or discharge  ENT:  No difficulty hearing, tinnitus, vertigo; No sinus or throat pain  NECK: No pain or stiffness  RESPIRATORY: No cough, wheezing, chills or hemoptysis; No Shortness of Breath  CARDIOVASCULAR: No chest pain, palpitations, passing out, dizziness, or leg swelling  GASTROINTESTINAL: No abdominal or epigastric pain. No nausea, vomiting, or hematemesis; No diarrhea or constipation. No melena or hematochezia.  GENITOURINARY: No dysuria, frequency, hematuria, or incontinence  NEUROLOGICAL: No headaches, memory loss, loss of strength, numbness, or tremors  SKIN: No itching, burning, rashes, or lesions   LYMPH Nodes: No enlarged glands  ENDOCRINE: No heat or cold intolerance; No hair loss  MUSCULOSKELETAL: No joint pain or swelling; No muscle, back, or extremity pain  PSYCHIATRIC: No depression, anxiety, mood swings, or difficulty sleeping  HEME/LYMPH: No easy bruising, or bleeding gums  ALLERGY AND IMMUNOLOGIC: No hives or eczema	      PHYSICAL EXAM:  T(C): 36.8 (03-26-18 @ 05:21), Max: 37 (03-26-18 @ 00:50)  HR: 64 (03-26-18 @ 05:21) (61 - 67)  BP: 110/72 (03-26-18 @ 05:21) (110/72 - 139/83)  RR: 16 (03-26-18 @ 05:21) (16 - 18)  SpO2: 96% (03-26-18 @ 05:21) (96% - 99%)  Wt(kg): --  I&O's Summary      Appearance: Normal	  HEENT:   Normal oral mucosa, PERRL, EOMI	  Lymphatic: No lymphadenopathy  Cardiovascular: Normal S1 S2, No JVD, No murmurs, No edema  Respiratory: Lungs clear to auscultation	  Psychiatry: A & O x 3, Mood & affect appropriate  Gastrointestinal:  Soft, Non-tender, + BS	  Skin: No rashes, No ecchymoses, No cyanosis	  Neurologic: Non-focal  Extremities: Normal range of motion, No clubbing, cyanosis or edema  Vascular: Peripheral pulses palpable 2+ bilaterally    MEDICATIONS  (STANDING):  aspirin enteric coated 81 milliGRAM(s) Oral daily  enoxaparin Injectable 40 milliGRAM(s) SubCutaneous daily  ergocalciferol 17721 Unit(s) Oral every week  metoprolol tartrate 12.5 milliGRAM(s) Oral two times a day  simvastatin 20 milliGRAM(s) Oral at bedtime        	  LABS:	 	    CARDIAC MARKERS:  CARDIAC MARKERS ( 25 Mar 2018 07:39 )  <0.015 ng/mL / x     / 78 U/L / x     / <1.0 ng/mL  CARDIAC MARKERS ( 24 Mar 2018 19:34 )  <0.015 ng/mL / x     / 92 U/L / x     / <1.0 ng/mL  CARDIAC MARKERS ( 24 Mar 2018 12:39 )  <0.015 ng/mL / x     / 105 U/L / x     / <1.0 ng/mL                                14.8   5.2   )-----------( 175      ( 26 Mar 2018 07:41 )             45.9     03-26    142  |  106  |  15  ----------------------------<  95  3.6   |  30  |  0.92    Ca    8.4      26 Mar 2018 07:41  Phos  3.3     03-26  Mg     2.3     03-26    TPro  6.6  /  Alb  3.4<L>  /  TBili  0.4  /  DBili  x   /  AST  15  /  ALT  18  /  AlkPhos  78  03-26      Lipid Profile: Cholesterol 191    HDL 47      HgA1c: Hemoglobin A1C, Whole Blood: 5.7 % (03-25 @ 10:21)    TSH: Thyroid Stimulating Hormone, Serum: 2.20 uU/mL (03-25 @ 07:39)

## 2018-03-26 NOTE — DISCHARGE NOTE ADULT - HOSPITAL COURSE
68 yo male from home lives with wife, no PMH uses Aspirin at home came to ED with Left sided chest pain for last 7 days. His pain got worse and started radiating to the Left arm 3 days ago. Pain is moderate in intensity and wasn't improving with ibuprofen at home. Pain gets aggravated with laying down at night. Pt has some associated Neck pain and movement of neck also exacerbates pain. He denies SOB, fever, cough, recent travel, sick contacts, problems with urine or bowel. Pt was admitted in [facility] >1 year ago with fall and dizziness and all the work up done was normal. Patient was admitted with L extremity pain, chest pain and neck pain. Patient underwent stress test that showed no ischemic events. Patient also underwent CT scan of cervical spine. Patient noted to have chronic changes of cspine. Patient advised to follow up with Dr. Johnson within 2 weeks for further evaluation

## 2018-03-26 NOTE — PROGRESS NOTE ADULT - SUBJECTIVE AND OBJECTIVE BOX
Patient is a 67y old  Male who presents with a chief complaint of Chest pain radiating to Left Forearm (24 Mar 2018 18:33)    pt seen in icu [  ], reg med floor [ x  ], bed [ x ], chair at bedside [   ], a+o x3 [ x ], lethargic [  ],  nad [ x ]        Allergies    No Known Allergies        Vitals    T(F): 98.2 (03-26-18 @ 05:21), Max: 98.6 (03-26-18 @ 00:50)  HR: 64 (03-26-18 @ 05:21) (61 - 67)  BP: 110/72 (03-26-18 @ 05:21) (110/72 - 139/83)  RR: 16 (03-26-18 @ 05:21) (16 - 18)  SpO2: 96% (03-26-18 @ 05:21) (96% - 99%)  Wt(kg): --  CAPILLARY BLOOD GLUCOSE          Labs                          14.6   4.9   )-----------( 173      ( 25 Mar 2018 07:39 )             44.9       03-25    141  |  105  |  13  ----------------------------<  85  3.7   |  27  |  0.85    Ca    8.6      25 Mar 2018 07:39  Phos  3.1     03-25  Mg     2.3     03-25    TPro  6.5  /  Alb  3.3<L>  /  TBili  0.6  /  DBili  x   /  AST  19  /  ALT  19  /  AlkPhos  73  03-25      CARDIAC MARKERS ( 25 Mar 2018 07:39 )  <0.015 ng/mL / x     / 78 U/L / x     / <1.0 ng/mL  CARDIAC MARKERS ( 24 Mar 2018 19:34 )  <0.015 ng/mL / x     / 92 U/L / x     / <1.0 ng/mL  CARDIAC MARKERS ( 24 Mar 2018 12:39 )  <0.015 ng/mL / x     / 105 U/L / x     / <1.0 ng/mL            Radiology Results      Meds    MEDICATIONS  (STANDING):  aspirin enteric coated 81 milliGRAM(s) Oral daily  enoxaparin Injectable 40 milliGRAM(s) SubCutaneous daily  metoprolol tartrate 12.5 milliGRAM(s) Oral two times a day  simvastatin 20 milliGRAM(s) Oral at bedtime      MEDICATIONS  (PRN):  acetaminophen   Tablet. 650 milliGRAM(s) Oral once PRN Moderate Pain (4 - 6)      Physical Exam    Neuro :  no focal deficits  Respiratory: CTA B/L  CV: RRR, S1S2, no murmurs,   Abdominal: Soft, NT, ND +BS,  Extremities: No edema, + peripheral pulses    ASSESSMENT    Chest pain  r/o acs  sinusitis  h/o Asthma  HTN (Hypertension)  No significant past surgical history      PLAN    tele d/c'd  cont lopressor, aspirin, statin,   cont fluticasone nasal spray,  ce q8 x3 neg  cardio f/u  f/u echo  f/u stress test  d/c plan if stress test neg Patient is a 67y old  Male who presents with a chief complaint of Chest pain radiating to Left Forearm (24 Mar 2018 18:33)    pt seen in icu [  ], reg med floor [ x  ], bed [ x ], chair at bedside [   ], a+o x3 [ x ], lethargic [  ],  nad [ x ]        Allergies    No Known Allergies        Vitals    T(F): 98.2 (03-26-18 @ 05:21), Max: 98.6 (03-26-18 @ 00:50)  HR: 64 (03-26-18 @ 05:21) (61 - 67)  BP: 110/72 (03-26-18 @ 05:21) (110/72 - 139/83)  RR: 16 (03-26-18 @ 05:21) (16 - 18)  SpO2: 96% (03-26-18 @ 05:21) (96% - 99%)  Wt(kg): --  CAPILLARY BLOOD GLUCOSE          Labs                          14.6   4.9   )-----------( 173      ( 25 Mar 2018 07:39 )             44.9       03-25    141  |  105  |  13  ----------------------------<  85  3.7   |  27  |  0.85    Ca    8.6      25 Mar 2018 07:39  Phos  3.1     03-25  Mg     2.3     03-25    TPro  6.5  /  Alb  3.3<L>  /  TBili  0.6  /  DBili  x   /  AST  19  /  ALT  19  /  AlkPhos  73  03-25      CARDIAC MARKERS ( 25 Mar 2018 07:39 )  <0.015 ng/mL / x     / 78 U/L / x     / <1.0 ng/mL  CARDIAC MARKERS ( 24 Mar 2018 19:34 )  <0.015 ng/mL / x     / 92 U/L / x     / <1.0 ng/mL  CARDIAC MARKERS ( 24 Mar 2018 12:39 )  <0.015 ng/mL / x     / 105 U/L / x     / <1.0 ng/mL            Radiology Results      Meds    MEDICATIONS  (STANDING):  aspirin enteric coated 81 milliGRAM(s) Oral daily  enoxaparin Injectable 40 milliGRAM(s) SubCutaneous daily  metoprolol tartrate 12.5 milliGRAM(s) Oral two times a day  simvastatin 20 milliGRAM(s) Oral at bedtime      MEDICATIONS  (PRN):  acetaminophen   Tablet. 650 milliGRAM(s) Oral once PRN Moderate Pain (4 - 6)      Physical Exam    Neuro :  no focal deficits  Respiratory: CTA B/L  CV: RRR, S1S2, no murmurs,   Abdominal: Soft, NT, ND +BS,  Extremities: No edema, + peripheral pulses    ASSESSMENT    Chest pain  r/o acs  sinusitis  cervical neuroforaminal stenosis  h/o Asthma  HTN (Hypertension)  No significant past surgical history      PLAN    tele d/c'd  cont lopressor, aspirin, statin,   cont fluticasone nasal spray,  ce q8 x3 neg  cardio f/u  f/u echo  f/u stress test  orthospine cons  d/c plan if stress test neg if cleared by orthospine surg

## 2018-03-26 NOTE — DISCHARGE NOTE ADULT - MEDICATION SUMMARY - MEDICATIONS TO TAKE
I will START or STAY ON the medications listed below when I get home from the hospital:    aspirin 81 mg oral delayed release tablet  -- 1 tab(s) by mouth once a day  -- Indication: For Chest pain    Ultram 50 mg oral tablet  -- 1 tab(s) by mouth every 6 hours, As Needed -for moderate pain MDD:4 tabs   -- Caution federal law prohibits the transfer of this drug to any person other  than the person for whom it was prescribed.  May cause drowsiness.  Alcohol may intensify this effect.  Use care when operating dangerous machinery.  Obtain medical advice before taking any non-prescription drugs as some may affect the action of this medication.    -- Indication: For prn pain     metoprolol succinate 25 mg oral tablet, extended release  -- 1 tab(s) by mouth once a day   -- It is very important that you take or use this exactly as directed.  Do not skip doses or discontinue unless directed by your doctor.  May cause drowsiness.  Alcohol may intensify this effect.  Use care when operating dangerous machinery.  Some non-prescription drugs may aggravate your condition.  Read all labels carefully.  If a warning appears, check with your doctor before taking.  Swallow whole.  Do not crush.  Take with food or milk.  This drug may impair the ability to drive or operate machinery.  Use care until you become familiar with its effects.    -- Indication: For Chest pain    albuterol  --  inhaled   -- Indication: For Asthma    Singulair  --  by mouth   -- Indication: For Asthma

## 2018-03-26 NOTE — DISCHARGE NOTE ADULT - CARE PROVIDER_API CALL
Zaki Johnson), Orthopaedic Surgery  [address] [place] [address]  Phone: [phone]  Fax: [phone]    Radhika Gómez), Internal Medicine  [address] [place] [id number]  Phone: [phone]  Fax: [phone]

## 2018-03-26 NOTE — DISCHARGE NOTE ADULT - CARE PLAN
Principal Discharge DX:	Neck pain  Goal:	To be pain free  Assessment and plan of treatment:	Please follow up with Dr. Johnson within 2 weeks   Diet as tolerated  Secondary Diagnosis:	ACS (acute coronary syndrome)  Goal:	Negative Stress test  Assessment and plan of treatment:	Follow up with Dr. Gómez as needed Pt with multiple sx in setting of fever, +sick contacts. Well appearing. Will give Tylenol, PO trial, if passes will d/c with instructions for PO hydration and antipyretics

## 2019-08-03 ENCOUNTER — EMERGENCY (EMERGENCY)
Facility: HOSPITAL | Age: 69
LOS: 1 days | Discharge: HOME | End: 2019-08-03
Attending: EMERGENCY MEDICINE
Payer: COMMERCIAL

## 2019-08-03 VITALS
RESPIRATION RATE: 17 BRPM | HEART RATE: 57 BPM | OXYGEN SATURATION: 100 % | DIASTOLIC BLOOD PRESSURE: 68 MMHG | TEMPERATURE: 98 F | SYSTOLIC BLOOD PRESSURE: 122 MMHG

## 2019-08-03 VITALS
RESPIRATION RATE: 16 BRPM | HEIGHT: 64 IN | OXYGEN SATURATION: 98 % | HEART RATE: 72 BPM | TEMPERATURE: 98 F | SYSTOLIC BLOOD PRESSURE: 117 MMHG | WEIGHT: 145.06 LBS | DIASTOLIC BLOOD PRESSURE: 75 MMHG

## 2019-08-03 PROBLEM — J45.909 UNSPECIFIED ASTHMA, UNCOMPLICATED: Chronic | Status: ACTIVE | Noted: 2017-05-17

## 2019-08-03 LAB
ANION GAP SERPL CALC-SCNC: 4 MMOL/L — LOW (ref 5–17)
APTT BLD: 36.4 SEC — HIGH (ref 27.5–36.3)
BASOPHILS # BLD AUTO: 0.05 K/UL — SIGNIFICANT CHANGE UP (ref 0–0.2)
BASOPHILS NFR BLD AUTO: 1 % — SIGNIFICANT CHANGE UP (ref 0–2)
BUN SERPL-MCNC: 13 MG/DL — SIGNIFICANT CHANGE UP (ref 7–18)
CALCIUM SERPL-MCNC: 8.7 MG/DL — SIGNIFICANT CHANGE UP (ref 8.4–10.5)
CHLORIDE SERPL-SCNC: 107 MMOL/L — SIGNIFICANT CHANGE UP (ref 96–108)
CK MB BLD-MCNC: <0.8 % — SIGNIFICANT CHANGE UP (ref 0–3.5)
CK MB CFR SERPL CALC: <1 NG/ML — SIGNIFICANT CHANGE UP (ref 0–3.6)
CK SERPL-CCNC: 130 U/L — SIGNIFICANT CHANGE UP (ref 35–232)
CO2 SERPL-SCNC: 30 MMOL/L — SIGNIFICANT CHANGE UP (ref 22–31)
CREAT SERPL-MCNC: 0.9 MG/DL — SIGNIFICANT CHANGE UP (ref 0.5–1.3)
EOSINOPHIL # BLD AUTO: 0.19 K/UL — SIGNIFICANT CHANGE UP (ref 0–0.5)
EOSINOPHIL NFR BLD AUTO: 3.7 % — SIGNIFICANT CHANGE UP (ref 0–6)
GLUCOSE SERPL-MCNC: 90 MG/DL — SIGNIFICANT CHANGE UP (ref 70–99)
HCT VFR BLD CALC: 43.6 % — SIGNIFICANT CHANGE UP (ref 39–50)
HGB BLD-MCNC: 14.1 G/DL — SIGNIFICANT CHANGE UP (ref 13–17)
IMM GRANULOCYTES NFR BLD AUTO: 0.4 % — SIGNIFICANT CHANGE UP (ref 0–1.5)
INR BLD: 1.06 RATIO — SIGNIFICANT CHANGE UP (ref 0.88–1.16)
LYMPHOCYTES # BLD AUTO: 1.48 K/UL — SIGNIFICANT CHANGE UP (ref 1–3.3)
LYMPHOCYTES # BLD AUTO: 28.5 % — SIGNIFICANT CHANGE UP (ref 13–44)
MCHC RBC-ENTMCNC: 29.9 PG — SIGNIFICANT CHANGE UP (ref 27–34)
MCHC RBC-ENTMCNC: 32.3 GM/DL — SIGNIFICANT CHANGE UP (ref 32–36)
MCV RBC AUTO: 92.4 FL — SIGNIFICANT CHANGE UP (ref 80–100)
MONOCYTES # BLD AUTO: 0.47 K/UL — SIGNIFICANT CHANGE UP (ref 0–0.9)
MONOCYTES NFR BLD AUTO: 9 % — SIGNIFICANT CHANGE UP (ref 2–14)
NEUTROPHILS # BLD AUTO: 2.99 K/UL — SIGNIFICANT CHANGE UP (ref 1.8–7.4)
NEUTROPHILS NFR BLD AUTO: 57.4 % — SIGNIFICANT CHANGE UP (ref 43–77)
NRBC # BLD: 0 /100 WBCS — SIGNIFICANT CHANGE UP (ref 0–0)
PLATELET # BLD AUTO: 186 K/UL — SIGNIFICANT CHANGE UP (ref 150–400)
POTASSIUM SERPL-MCNC: 3.9 MMOL/L — SIGNIFICANT CHANGE UP (ref 3.5–5.3)
POTASSIUM SERPL-SCNC: 3.9 MMOL/L — SIGNIFICANT CHANGE UP (ref 3.5–5.3)
PROTHROM AB SERPL-ACNC: 11.8 SEC — SIGNIFICANT CHANGE UP (ref 10–12.9)
RBC # BLD: 4.72 M/UL — SIGNIFICANT CHANGE UP (ref 4.2–5.8)
RBC # FLD: 13.4 % — SIGNIFICANT CHANGE UP (ref 10.3–14.5)
SODIUM SERPL-SCNC: 141 MMOL/L — SIGNIFICANT CHANGE UP (ref 135–145)
TROPONIN I SERPL-MCNC: <0.015 NG/ML — SIGNIFICANT CHANGE UP (ref 0–0.04)
WBC # BLD: 5.2 K/UL — SIGNIFICANT CHANGE UP (ref 3.8–10.5)
WBC # FLD AUTO: 5.2 K/UL — SIGNIFICANT CHANGE UP (ref 3.8–10.5)

## 2019-08-03 PROCEDURE — 73060 X-RAY EXAM OF HUMERUS: CPT

## 2019-08-03 PROCEDURE — 85730 THROMBOPLASTIN TIME PARTIAL: CPT

## 2019-08-03 PROCEDURE — 80048 BASIC METABOLIC PNL TOTAL CA: CPT

## 2019-08-03 PROCEDURE — 82553 CREATINE MB FRACTION: CPT

## 2019-08-03 PROCEDURE — 84484 ASSAY OF TROPONIN QUANT: CPT

## 2019-08-03 PROCEDURE — 99284 EMERGENCY DEPT VISIT MOD MDM: CPT | Mod: 25

## 2019-08-03 PROCEDURE — 99285 EMERGENCY DEPT VISIT HI MDM: CPT

## 2019-08-03 PROCEDURE — 85610 PROTHROMBIN TIME: CPT

## 2019-08-03 PROCEDURE — 71046 X-RAY EXAM CHEST 2 VIEWS: CPT

## 2019-08-03 PROCEDURE — 71046 X-RAY EXAM CHEST 2 VIEWS: CPT | Mod: 26

## 2019-08-03 PROCEDURE — 73060 X-RAY EXAM OF HUMERUS: CPT | Mod: 26,LT

## 2019-08-03 PROCEDURE — 93005 ELECTROCARDIOGRAM TRACING: CPT

## 2019-08-03 PROCEDURE — 82550 ASSAY OF CK (CPK): CPT

## 2019-08-03 PROCEDURE — 85027 COMPLETE CBC AUTOMATED: CPT

## 2019-08-03 PROCEDURE — 36415 COLL VENOUS BLD VENIPUNCTURE: CPT

## 2019-08-03 NOTE — ED PROVIDER NOTE - CLINICAL SUMMARY MEDICAL DECISION MAKING FREE TEXT BOX
Chest pain/left arm pain with bruising. Order labs, check platelet function, cardiac enzymes and reassess.

## 2019-08-03 NOTE — ED PROVIDER NOTE - OBJECTIVE STATEMENT
67 y/o M with a PMHx of HTN, Asthma, CAD (on Plavix, denies stents) and no PSHx presents to ED c/o CP x 2 weeks, on and off. Pt also reports bruising of left arm x yesterday. Denies any specific trauma or any reasons for bruising, denies any other bruising on body. Denies bleeding while brushing teeth or blood in stool/urine. NKDA.

## 2020-03-21 ENCOUNTER — EMERGENCY (EMERGENCY)
Facility: HOSPITAL | Age: 70
LOS: 1 days | Discharge: HOME | End: 2020-03-21
Attending: EMERGENCY MEDICINE | Admitting: EMERGENCY MEDICINE
Payer: MEDICARE

## 2020-03-21 VITALS
OXYGEN SATURATION: 97 % | DIASTOLIC BLOOD PRESSURE: 66 MMHG | HEART RATE: 100 BPM | SYSTOLIC BLOOD PRESSURE: 104 MMHG | RESPIRATION RATE: 18 BRPM | TEMPERATURE: 100 F

## 2020-03-21 VITALS — HEART RATE: 90 BPM

## 2020-03-21 PROBLEM — I25.10 ATHEROSCLEROTIC HEART DISEASE OF NATIVE CORONARY ARTERY WITHOUT ANGINA PECTORIS: Chronic | Status: ACTIVE | Noted: 2019-08-03

## 2020-03-21 PROCEDURE — 99283 EMERGENCY DEPT VISIT LOW MDM: CPT

## 2020-03-21 NOTE — ED PROVIDER NOTE - PHYSICAL EXAMINATION
General: Patient in no apparent distress, AAO x 3  Skin: Dry and intact  HEENT: Oral mucosa moist. No pharyngeal exudates or tonsillar enlargement  Eyes: Conjunctiva normal  Cardiac: Regular rhythm and rate. No pretibial edema b/l  Respiratory: Lungs clear b/l and symmetric. No respiratory distress. speaking in complete sentences  Gastrointestinal: Abdomen soft, nondistended, nontender  Musculoskeletal: Moves all extremities spontaneously  Neurological: alert and oriented to person, place and time  Psychiatric: Cooperative

## 2020-03-21 NOTE — ED PROVIDER NOTE - NSFOLLOWUPINSTRUCTIONS_ED_ALL_ED_FT
You are discharged to go home  Please return to the Emergency Room if your symptoms change or worsen    _______________________________________________    A 14 DAY SELF-QUARANTINE PERIOD WAS RECOMMENDED TO YOU AS PART OF YOUR CARE:  FOR A 14 DAY PERIOD:    Stay inside your home as much as possible, avoiding public places or public interaction.     Do not go to work. If you do enter any public domain, at minimum wear a surgical mask at all times.     Even while indoors, attempt to remain isolated from other individuals such as family or friends, as much as possible.     Return to the emergency room for any symptoms such as worsening shortness of breath, significant worsening cough, high fevers despite antipyretics, or severe weakness/malaise    Take tylenol 1000mg every 6hours for body pain or fevers, fluids, and rest

## 2020-03-21 NOTE — ED PROVIDER NOTE - OBJECTIVE STATEMENT
70yo M nonsmoker with PMHX CAD (denies stents but on daily ASA), HTN, asthma, p/w 3 days of dry cough, fatigue, fevers, body aches. Admits to feeling at times sob since sick but now feeling well. No chest pain ,recent travel, or sick contacts. Lives with wife who feels well

## 2020-03-21 NOTE — ED PROVIDER NOTE - CLINICAL SUMMARY MEDICAL DECISION MAKING FREE TEXT BOX
68yo M nonsmoker with PMHX CAD (denies stents but on daily ASA), HTN, asthma, p/w 3 days of dry cough, fatigue, fevers, body aches. Admits to feeling at times sob since sick but now feeling well. No chest pain ,recent travel, or sick contacts. Lives with wife who feels well. a/p viral syndrome, concern for COVID-19. breathing on room air and well appearing. will dc with home quarantine and strict return precautions and supportive care

## 2020-03-21 NOTE — ED PROVIDER NOTE - NS ED ROS FT
Constitutional: +fatigue, + fevers +chills  Skin: No rash or pruritis  HEENT: No sore throat  Cardiovascular: No chest pain, no shortness of breath  Respiratory: No shortness of breath,  cough  Gastrointestinal: No abdominal pain, no nausea, no vomiting, no diarrhea, no constipation  Musculoskeletal: +body aches  Genitourinary: No dysuria or hematuria  Neurological: No weakness or tingling

## 2020-03-21 NOTE — ED PROVIDER NOTE - PATIENT PORTAL LINK FT
You can access the FollowMyHealth Patient Portal offered by [facility] by registering at the following website: [email or web address]. By joining [employer]’s FollowMyHealth portal, you will also be able to view your health information using other applications (apps) compatible with our system.

## 2020-11-17 NOTE — ED ADULT TRIAGE NOTE - PATIENT ON (OXYGEN DELIVERY METHOD)
Writer contacted Milly and informed her that script was already sent to the pharmacy.  Milly states an understanding with no further questions.   room air

## 2020-12-30 NOTE — ED ADULT NURSE NOTE - CAS TRG GENERAL NORM CIRC DET
Strong peripheral pulses I have reviewed and confirmed nurses' notes for patient's medications, allergies, medical history, and surgical history.

## 2021-03-18 ENCOUNTER — EMERGENCY (EMERGENCY)
Facility: HOSPITAL | Age: 71
LOS: 1 days | Discharge: HOME | End: 2021-03-18
Attending: EMERGENCY MEDICINE
Payer: COMMERCIAL

## 2021-03-18 VITALS
OXYGEN SATURATION: 95 % | TEMPERATURE: 97 F | HEIGHT: 64 IN | RESPIRATION RATE: 18 BRPM | HEART RATE: 74 BPM | DIASTOLIC BLOOD PRESSURE: 83 MMHG | WEIGHT: 139.99 LBS | SYSTOLIC BLOOD PRESSURE: 124 MMHG

## 2021-03-18 LAB
ALBUMIN SERPL ELPH-MCNC: 3.5 G/DL — SIGNIFICANT CHANGE UP (ref 3.5–5)
ALP SERPL-CCNC: 89 U/L — SIGNIFICANT CHANGE UP (ref 40–120)
ALT FLD-CCNC: 24 U/L DA — SIGNIFICANT CHANGE UP (ref 10–60)
ANION GAP SERPL CALC-SCNC: 4 MMOL/L — LOW (ref 5–17)
AST SERPL-CCNC: 16 U/L — SIGNIFICANT CHANGE UP (ref 10–40)
BASOPHILS # BLD AUTO: 0.05 K/UL — SIGNIFICANT CHANGE UP (ref 0–0.2)
BASOPHILS NFR BLD AUTO: 0.8 % — SIGNIFICANT CHANGE UP (ref 0–2)
BILIRUB SERPL-MCNC: 0.4 MG/DL — SIGNIFICANT CHANGE UP (ref 0.2–1.2)
BUN SERPL-MCNC: 13 MG/DL — SIGNIFICANT CHANGE UP (ref 7–18)
CALCIUM SERPL-MCNC: 8.8 MG/DL — SIGNIFICANT CHANGE UP (ref 8.4–10.5)
CHLORIDE SERPL-SCNC: 107 MMOL/L — SIGNIFICANT CHANGE UP (ref 96–108)
CO2 SERPL-SCNC: 29 MMOL/L — SIGNIFICANT CHANGE UP (ref 22–31)
CREAT SERPL-MCNC: 0.97 MG/DL — SIGNIFICANT CHANGE UP (ref 0.5–1.3)
EOSINOPHIL # BLD AUTO: 0.23 K/UL — SIGNIFICANT CHANGE UP (ref 0–0.5)
EOSINOPHIL NFR BLD AUTO: 3.8 % — SIGNIFICANT CHANGE UP (ref 0–6)
GLUCOSE SERPL-MCNC: 96 MG/DL — SIGNIFICANT CHANGE UP (ref 70–99)
HCT VFR BLD CALC: 41.7 % — SIGNIFICANT CHANGE UP (ref 39–50)
HGB BLD-MCNC: 13.4 G/DL — SIGNIFICANT CHANGE UP (ref 13–17)
IMM GRANULOCYTES NFR BLD AUTO: 0.5 % — SIGNIFICANT CHANGE UP (ref 0–1.5)
LYMPHOCYTES # BLD AUTO: 1.35 K/UL — SIGNIFICANT CHANGE UP (ref 1–3.3)
LYMPHOCYTES # BLD AUTO: 22.3 % — SIGNIFICANT CHANGE UP (ref 13–44)
MAGNESIUM SERPL-MCNC: 2.3 MG/DL — SIGNIFICANT CHANGE UP (ref 1.6–2.6)
MCHC RBC-ENTMCNC: 29.5 PG — SIGNIFICANT CHANGE UP (ref 27–34)
MCHC RBC-ENTMCNC: 32.1 GM/DL — SIGNIFICANT CHANGE UP (ref 32–36)
MCV RBC AUTO: 91.6 FL — SIGNIFICANT CHANGE UP (ref 80–100)
MONOCYTES # BLD AUTO: 0.43 K/UL — SIGNIFICANT CHANGE UP (ref 0–0.9)
MONOCYTES NFR BLD AUTO: 7.1 % — SIGNIFICANT CHANGE UP (ref 2–14)
NEUTROPHILS # BLD AUTO: 3.97 K/UL — SIGNIFICANT CHANGE UP (ref 1.8–7.4)
NEUTROPHILS NFR BLD AUTO: 65.5 % — SIGNIFICANT CHANGE UP (ref 43–77)
NRBC # BLD: 0 /100 WBCS — SIGNIFICANT CHANGE UP (ref 0–0)
PLATELET # BLD AUTO: 204 K/UL — SIGNIFICANT CHANGE UP (ref 150–400)
POTASSIUM SERPL-MCNC: 3.9 MMOL/L — SIGNIFICANT CHANGE UP (ref 3.5–5.3)
POTASSIUM SERPL-SCNC: 3.9 MMOL/L — SIGNIFICANT CHANGE UP (ref 3.5–5.3)
PROT SERPL-MCNC: 6.5 G/DL — SIGNIFICANT CHANGE UP (ref 6–8.3)
RBC # BLD: 4.55 M/UL — SIGNIFICANT CHANGE UP (ref 4.2–5.8)
RBC # FLD: 13.2 % — SIGNIFICANT CHANGE UP (ref 10.3–14.5)
SODIUM SERPL-SCNC: 140 MMOL/L — SIGNIFICANT CHANGE UP (ref 135–145)
WBC # BLD: 6.06 K/UL — SIGNIFICANT CHANGE UP (ref 3.8–10.5)
WBC # FLD AUTO: 6.06 K/UL — SIGNIFICANT CHANGE UP (ref 3.8–10.5)

## 2021-03-18 PROCEDURE — 99284 EMERGENCY DEPT VISIT MOD MDM: CPT | Mod: 25

## 2021-03-18 PROCEDURE — 99285 EMERGENCY DEPT VISIT HI MDM: CPT

## 2021-03-18 PROCEDURE — 36415 COLL VENOUS BLD VENIPUNCTURE: CPT

## 2021-03-18 PROCEDURE — 70450 CT HEAD/BRAIN W/O DYE: CPT | Mod: 26,MA

## 2021-03-18 PROCEDURE — 85025 COMPLETE CBC W/AUTO DIFF WBC: CPT

## 2021-03-18 PROCEDURE — 96374 THER/PROPH/DIAG INJ IV PUSH: CPT

## 2021-03-18 PROCEDURE — 70450 CT HEAD/BRAIN W/O DYE: CPT

## 2021-03-18 PROCEDURE — 80053 COMPREHEN METABOLIC PANEL: CPT

## 2021-03-18 PROCEDURE — 83735 ASSAY OF MAGNESIUM: CPT

## 2021-03-18 RX ORDER — KETOROLAC TROMETHAMINE 30 MG/ML
30 SYRINGE (ML) INJECTION ONCE
Refills: 0 | Status: DISCONTINUED | OUTPATIENT
Start: 2021-03-18 | End: 2021-03-18

## 2021-03-18 RX ORDER — CYCLOBENZAPRINE HYDROCHLORIDE 10 MG/1
10 TABLET, FILM COATED ORAL ONCE
Refills: 0 | Status: COMPLETED | OUTPATIENT
Start: 2021-03-18 | End: 2021-03-18

## 2021-03-18 RX ADMIN — CYCLOBENZAPRINE HYDROCHLORIDE 10 MILLIGRAM(S): 10 TABLET, FILM COATED ORAL at 23:10

## 2021-03-18 RX ADMIN — Medication 30 MILLIGRAM(S): at 23:10

## 2021-03-18 NOTE — ED PROVIDER NOTE - PATIENT PORTAL LINK FT
You can access the FollowMyHealth Patient Portal offered by [facility] by registering at the following website: [email or web address]. By joining [employer]’s FollowMyHealth portal, you will also be able to view your health information using other applications (apps) compatible with our system.

## 2021-03-18 NOTE — ED PROVIDER NOTE - NSFOLLOWUPINSTRUCTIONS_ED_ALL_ED_FT
Log Out.      [facility] [employer]® CareNotes®     :  [facility]  	                       TEMPOROMANDIBULAR DISORDER - AfterCare(R) Instructions(ER/ED)           Transtorno temporomandibular    LO QUE NECESITA SABER:    El trastorno temporomandibular es mary condición que provoca dolor en la mandíbula. El trantorno afecta la articulación entre el hueso temporal y la mandíbula (maxilar). Los nervios de los músculos alrededor de la articulación también se afectan.     Mandíbula         INSTRUCCIONES SOBRE EL ELENA HOSPITALARIA:    Medicamentos:  •Analgésicos:Es posible que le receten un medicamento para aliviar el dolor. No espere hasta que el dolor sea severo antes de james olga medicamento.      •AINEs (analgésicos antiinflamatorios no esteroides):Estos medicamentos disminuyen la inflamación y el dolor. Usted puede adquirir los medicamentos FRANKY sin receta médica. Pregunte a hussein médico cuál medicamento es adecuado para usted y cuánto debería james. Tómelos esther se le indique. Cuando no se richard de la manera indicada, los medicamentos antiinflamatorios no esteroides pueden causar sangrado estomacal o problemas renales.      •Relajantes muscularesayudan a reducir dolor y espasmos musculares.      •[place] bernardo medicamentos esther se le haya indicado.Consulte con hussein médico si usted curt que hussein medicamento no le está ayudando o si presenta efectos secundarios. Infórmele si es alérgico a algún medicamento. Mantenga mary lista actualizada de los medicamentos, las vitaminas y los productos herbales que shawn. Incluya los siguientes datos de los medicamentos: cantidad, frecuencia y motivo de administración. Traiga con usted la lista o los envases de las píldoras a bernardo citas de seguimiento. Lleve la lista de los medicamentos con usted en varinder de mary emergencia.      Acuda a bernardo consultas de control con hussein médico según le indicaron.Anote bernardo preguntas para que se acuerde de hacerlas jovany bernardo visitas.    El manejo de bernardo síntomas:  •Consuma alimentos blandos:Hussein médico podría sugerirle que consuma solo alimentos blandos por varios días. Un dietista podría trabajar con usted para encontrar los alimentos que son mas fáciles de morder, masticar y tragar. Los ejemplos son sopa, puré de manzana, requesón, pudín, yogurt y frutas blandas.      •Utilice dispositivos de apoyo para la mandíbula:Podrían usarse tablillas para apoyar la mandíbula o evitar que se mueva. Es posible que usted necesite usar un protector bucal para evitar que apriete o rechine los dientes mientras duerme.      •Use hielo y calor:El hielo ayuda a disminuir la inflamación y el dolor. El hielo también puede contribuir a evitar el daño de los tejidos. Use mary compresa de hielo o ponga hielo triturado en mary bolsa de plástico. Cúbrala con mary toalla y colóquela sobre la mandíbula por 15 a 20 minutos cada hora o esther se le indique. Después de las primeras 24 a 48 horas, use calor para disminuir el dolor, la inflamación y los espasmos musculares. Aplíquese calor en el área lesionada jovany 20 a 30 minutos cada 2 horas jovany la cantidad de días que le indiquen. Use cojines térmicos, compresas húmedas tibias o mary botella con [place].      •Vaya a terapia física:Un fisioterapeuta le enseña ejercicios para ayudarlo a mejorar el movimiento y la fuerza, así esther para disminuir el dolor en la mandíbula. Un terapista de lenguaje podría ayudarlo con ejercicios para tragar y hablar.      Comuníquese con hussein médico si:  •Tiene fiebre.      •La tablilla o protector bucal están flojos.      •Usted tiene preguntas o inquietudes acerca de hussein condición o cuidado.      Regrese a la isabel de emergencias si:  •Tiene náusea, vómitos o no puede retener líquidos en el estómago.      •Usted tiene dolor que no se cecilia, incluso después de james el medicamento para el dolor.      •Tiene dificultad para respirar, hablar, beber, comer o tragar.      •La tablilla o el protector bucal se dañan o se quiebran.         © Copyright [employer] 2021           back to top                          © Copyright [employer] 2021

## 2021-03-18 NOTE — ED PROVIDER NOTE - CLINICAL SUMMARY MEDICAL DECISION MAKING FREE TEXT BOX
70 year old male with episode of lockjaw that resolved. vitals WNL. Pe as above.  labs, ct head, muscle relaxer/toradol, reassess

## 2021-03-18 NOTE — ED PROVIDER NOTE - PROGRESS NOTE DETAILS
symptoms resolved completely. labs are unremarkable. ct head no acute abnormality. will discharge. f/u with PMD. motrin/tylenol for pain. return precautions discussed.

## 2021-03-18 NOTE — ED ADULT TRIAGE NOTE - CHIEF COMPLAINT QUOTE
Pt BIBA s/p he had lockjaw 1 hour ago lasted 10 mins and he could not speak compliant of generalized weakness and pain to both sides of his jaw. Pt has cardiac history.

## 2021-03-18 NOTE — ED PROVIDER NOTE - OBJECTIVE STATEMENT
70 year old male CAD, HTN, asthma coming in with locking of b/l jaw that lasted for about 15min and then resolved. states he couldn't move his jaw and it was stuck in one position so he was having difficulty speaking while this was happening. once this resolved states b/l jaw area has some soreness but other than that denies all other complaints. denies ha, vision changes, neck pains, cp, sob, palpitations, abd pains, N/v/D/C, fevers, chills, sweats, back pains, numbness, tingling, focal weakness.

## 2021-03-19 VITALS
HEART RATE: 68 BPM | RESPIRATION RATE: 18 BRPM | TEMPERATURE: 98 F | DIASTOLIC BLOOD PRESSURE: 76 MMHG | SYSTOLIC BLOOD PRESSURE: 112 MMHG | OXYGEN SATURATION: 95 %

## 2021-06-27 NOTE — ED PROVIDER NOTE - SKIN [+], MLM
Apply Efudex cream twice daily x 2 weeks     Apply Benzoyl Peroxide cream to spot on right lower cheek one to two times per day for 4-6 weeks.    Follow up if spot does not heal with Benzoyl peroxide within 4-6 weeks     WOUND CARE INSTRUCTIONS   FOR CRYOSURGERY   This area treated with liquid nitrogen will form a blister. You do not need to bandage the area until after the blister forms and breaks (which may be a few days). When the blister breaks, begin daily dressing changes as follows:   1) Clean and dry the area with tap water using clean Q-tip or sterile gauze pad.   2) Apply Polysporin ointment or Bacitracin ointment over entire wound. Do NOT use Neosporin ointment.   3) Cover the wound with a band-aid or sterile non-stick gauze pad and micropore paper tape.   REPEAT THESE INSTRUCTIONS AT LEAST ONCE A DAY UNTIL THE WOUND HAS COMPLETELY HEALED.   It is an old wives tale that a wound heals better when it is exposed to air and allowed to dry out. The wound will heal faster with a better cosmetic result if it is kept moist with ointment and covered with a bandage.   Do not let the wound dry out.   IMPORTANT INFORMATION ON REVERSE SIDE   Supplies Needed:   *Cotton tipped applicators (Q-tips)   *Polysporin ointment or Bacitracin ointment (NOT NEOSPORIN)   *Band-aids, or non stick gauze pads and micropore paper tape   PATIENT INFORMATION   During the healing process you will notice a number of changes. All wounds develop a small halo of redness surrounding the wound. This means healing is occurring. Severe itching with extensive redness usually indicates sensitivity to the ointment or bandage tape used to dress the wound. You should call our office if this develops.   Swelling and/or discoloration around your surgical site is common, particularly when performed around the eye.   All wounds normally drain. The larger the wound the more drainage there will be. After 7-10 days, you will notice the wound beginning to 
DISCHARGE
shrink and new skin will begin to grow. The wound is healed when you can see skin has formed over the entire area. A healed wound has a healthy, shiny look to the surface and is red to dark pink in color to normalize. Wounds may take approximately 4-6 weeks to heal. Larger wounds may take 6-8 weeks. After the wound is healed you may discontinue dressing changes.   You may experience a sensation of tightness as your wound heals. This is normal and will gradually subside.   Your healed wound may be sensitive to temperature changes. This sensitivity improves with time, but if you re having a lot of discomfort, try to avoid temperature extremes.   Patients frequently experience itching after their wound appears to have healed because of the continue healing under the skin. Plain Vaseline will help relieve the itching.          
BRUISING/left rm

## 2023-07-14 NOTE — ED ADULT NURSE NOTE - CAS DISCH CONDITION
Initiate Treatment: Fluconazole and ketoconazole cream
Render In Strict Bullet Format?: No
Detail Level: Detailed
Stable

## 2025-05-27 NOTE — ED ADULT TRIAGE NOTE - BP NONINVASIVE SYSTOLIC (MM HG)
Reason for call:   [x] Refill   [] Prior Auth  [] Other:     Office:   [x] PCP/Provider - Dr Curtis   [] Specialty/Provider -     Medication: zolpidem     Dose/Frequency: 5 mg take daily at bedtime as needed    Quantity: 30    Pharmacy: [facility] on [address]     Local Pharmacy   Does the patient have enough for 3 days?   [] Yes   [x] No - Send as HP to POD    Mail Away Pharmacy   Does the patient have enough for 10 days?   [] Yes   [] No - Send as HP to POD     129